# Patient Record
Sex: FEMALE | Race: BLACK OR AFRICAN AMERICAN | URBAN - METROPOLITAN AREA
[De-identification: names, ages, dates, MRNs, and addresses within clinical notes are randomized per-mention and may not be internally consistent; named-entity substitution may affect disease eponyms.]

---

## 2023-03-23 ENCOUNTER — TELEPHONE ENCOUNTER (OUTPATIENT)
Dept: URBAN - METROPOLITAN AREA CLINIC 92 | Facility: CLINIC | Age: 43
End: 2023-03-23

## 2023-03-24 ENCOUNTER — DASHBOARD ENCOUNTERS (OUTPATIENT)
Age: 43
End: 2023-03-24

## 2023-03-24 ENCOUNTER — TELEPHONE ENCOUNTER (OUTPATIENT)
Dept: URBAN - METROPOLITAN AREA CLINIC 92 | Facility: CLINIC | Age: 43
End: 2023-03-24

## 2023-03-24 ENCOUNTER — OFFICE VISIT (OUTPATIENT)
Dept: URBAN - METROPOLITAN AREA CLINIC 92 | Facility: CLINIC | Age: 43
End: 2023-03-24
Payer: COMMERCIAL

## 2023-03-24 ENCOUNTER — WEB ENCOUNTER (OUTPATIENT)
Dept: URBAN - METROPOLITAN AREA CLINIC 92 | Facility: CLINIC | Age: 43
End: 2023-03-24

## 2023-03-24 VITALS
HEIGHT: 66 IN | BODY MASS INDEX: 32.59 KG/M2 | TEMPERATURE: 97.5 F | SYSTOLIC BLOOD PRESSURE: 156 MMHG | DIASTOLIC BLOOD PRESSURE: 91 MMHG | HEART RATE: 90 BPM | WEIGHT: 202.8 LBS

## 2023-03-24 DIAGNOSIS — R10.33 PERIUMBILICAL ABDOMINAL PAIN: ICD-10-CM

## 2023-03-24 DIAGNOSIS — K63.89 MESENTERIC MASS: ICD-10-CM

## 2023-03-24 DIAGNOSIS — K92.0 HEMATEMESIS WITH NAUSEA: ICD-10-CM

## 2023-03-24 PROCEDURE — 99204 OFFICE O/P NEW MOD 45 MIN: CPT

## 2023-03-24 NOTE — HPI-TODAY'S VISIT:
42 year old female patient seen today for a ER visit f/u. She presented to [facility] c/o epi abd pain x 1 day with vomitting no hematatemesis. 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 Less likely differential constiderations inclue mesothelioma, lyphoma, peritoneal metastatis disease. Surgery consult is recommeneded. Also noted was uterine lesions likely fibriod obtain pelvic US, nonobstructive right nephrolithiasis, s/p cholestectomy, hepatomegaly.  Today patient is in pain and in fetal position on exam table.  She states that since discharge she is still having abdominal pain despite dicyclomine that she was given.  The pain is present all day and night.  She also notes that she is not able to tolerate any solids or liquids since discharge.  She had several episodes of hematemesis last episode was earlier this morning.  Zofran is not helping. She also notes she has not had a bowel movement in 4 days.  She is also having increased shortness of breath which is new for her.

## 2023-03-24 NOTE — PHYSICAL EXAM GASTROINTESTINAL
Abdomen,  soft, Periumbilical Tenderness, nondistended,  no guarding or rigidity,  no masses palpable,  normal bowel sounds Liver and Spleen,no hepatosplenomegaly